# Patient Record
(demographics unavailable — no encounter records)

---
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
## 2025-02-10 NOTE — PHYSICAL EXAM
[FreeTextEntry1] : NEURO: Mental Status Oriented to person, place, time, and situation Able to name current and past presidents.  Speech is clear, fluent, and spontaneous. Somewhat slowed. Comprehension and memory intact. Responds appropriately.    Cranial Nerves Visual fields full to confrontation Pupils equal, round, reactive to light. Extraocular movements intact. No nystagmus or ptosis. Facial sensation diminished to light touch bilaterally but symmetric. Facial movement intact and symmetric Uvula midline, soft palate elevates normally Bilateral shoulder shrug intact Tongue midline, no tongue bite sign or deviation on protrusion  Neck ROM intact but elicits some pain left side. Trap spasm b/l.    Motor Tone and bulk intact Shoulder abduction: 5/5 b/l Elbow flexion/extension: 5/5 bl Hand : 5/5 b/l Hip flexion/extension: 5/5 b/l Knee flexion/extension: 5/5 b/l No pronator drift   Sensation Light touch grossly diminished to LT b/l upper and lower extremities. LUE worse than RUE.    Deep Tendon Reflexes 1+ throughout.    Coordination Normal finger to nose bilaterally No past pointing    Gait Normal stance, stride, and pivot turn Negative Romberg.     GEN: awake, alert, interactive, no acute distress EYES: sclera white, conjunctiva clear, no redness or discharge ENT: normal appearing outer ears, hearing grossly intact EXT: moving all extremities spontaneously SKIN: warm, dry